# Patient Record
Sex: MALE
[De-identification: names, ages, dates, MRNs, and addresses within clinical notes are randomized per-mention and may not be internally consistent; named-entity substitution may affect disease eponyms.]

---

## 2021-06-02 ENCOUNTER — DASHBOARD ENCOUNTERS (OUTPATIENT)
Age: 76
End: 2021-06-02

## 2021-06-02 ENCOUNTER — OFFICE VISIT (OUTPATIENT)
Dept: URBAN - METROPOLITAN AREA TELEHEALTH 2 | Facility: TELEHEALTH | Age: 76
End: 2021-06-02

## 2021-06-02 NOTE — HPI-TODAY'S VISIT:
CALLED PT X 2-LEFT MESSAGE, NO SHOWED  Patient was referred by FARZANA Pedersen for an evaluation of abnormal lfts.  A copy of this note will be sent to the referring provider.  May 2021 labs  Creatinine 1.08  Sodium 134  Potassium 5.4, elevated  Albumin 3.6, low  Total bilirubin 0.6  Alkaline phosphatase 324  AST 78  ALT 78  Triglycerides elevated 116  Hemoglobin A1c 6.3%  TSH elevated 5.620, elevated  SARS COV2 antibody IgG positive    Last imaging was Duration of visit 45 mins with over 50% of the time explaining pt's condition and treatment plan and revieweing chart

## 2021-07-20 ENCOUNTER — OFFICE VISIT (OUTPATIENT)
Dept: URBAN - METROPOLITAN AREA CLINIC 128 | Facility: CLINIC | Age: 76
End: 2021-07-20